# Patient Record
Sex: FEMALE | Race: WHITE | URBAN - METROPOLITAN AREA
[De-identification: names, ages, dates, MRNs, and addresses within clinical notes are randomized per-mention and may not be internally consistent; named-entity substitution may affect disease eponyms.]

---

## 2019-03-07 ENCOUNTER — Encounter (OUTPATIENT)
Dept: URBAN - METROPOLITAN AREA CLINIC 71 | Facility: CLINIC | Age: 74
End: 2019-03-07
Payer: MEDICARE

## 2019-03-07 PROCEDURE — 99214 OFFICE O/P EST MOD 30 MIN: CPT | Performed by: OPHTHALMOLOGY

## 2019-03-19 ENCOUNTER — Encounter (OUTPATIENT)
Dept: URBAN - METROPOLITAN AREA CLINIC 71 | Facility: CLINIC | Age: 74
End: 2019-03-19
Payer: MEDICARE

## 2019-03-19 PROCEDURE — 99213 OFFICE O/P EST LOW 20 MIN: CPT | Performed by: OPHTHALMOLOGY

## 2019-04-02 ENCOUNTER — Encounter (OUTPATIENT)
Dept: URBAN - METROPOLITAN AREA CLINIC 71 | Facility: CLINIC | Age: 74
End: 2019-04-02
Payer: MEDICARE

## 2019-04-02 PROCEDURE — 99214 OFFICE O/P EST MOD 30 MIN: CPT | Performed by: OPHTHALMOLOGY

## 2019-09-12 ENCOUNTER — Encounter (OUTPATIENT)
Dept: URBAN - METROPOLITAN AREA CLINIC 71 | Facility: CLINIC | Age: 74
End: 2019-09-12
Payer: MEDICARE

## 2019-09-12 PROCEDURE — 92133 CPTRZD OPH DX IMG PST SGM ON: CPT | Performed by: OPHTHALMOLOGY

## 2019-09-12 PROCEDURE — 92014 COMPRE OPH EXAM EST PT 1/>: CPT | Performed by: OPHTHALMOLOGY

## 2019-10-31 ENCOUNTER — Encounter (OUTPATIENT)
Dept: URBAN - METROPOLITAN AREA CLINIC 71 | Facility: CLINIC | Age: 74
End: 2019-10-31
Payer: MEDICARE

## 2019-10-31 PROCEDURE — 92014 COMPRE OPH EXAM EST PT 1/>: CPT | Performed by: OPHTHALMOLOGY

## 2019-12-03 ENCOUNTER — Encounter (OUTPATIENT)
Dept: URBAN - METROPOLITAN AREA CLINIC 71 | Facility: CLINIC | Age: 74
End: 2019-12-03
Payer: MEDICARE

## 2019-12-03 PROCEDURE — 92083 EXTENDED VISUAL FIELD XM: CPT | Performed by: OPHTHALMOLOGY

## 2019-12-03 PROCEDURE — 99214 OFFICE O/P EST MOD 30 MIN: CPT | Performed by: OPHTHALMOLOGY

## 2020-03-05 ENCOUNTER — OFFICE VISIT (OUTPATIENT)
Dept: URBAN - METROPOLITAN AREA CLINIC 71 | Facility: CLINIC | Age: 75
End: 2020-03-05
Payer: MEDICARE

## 2020-03-05 PROCEDURE — 99214 OFFICE O/P EST MOD 30 MIN: CPT | Performed by: OPHTHALMOLOGY

## 2020-03-05 PROCEDURE — 92133 CPTRZD OPH DX IMG PST SGM ON: CPT | Performed by: OPHTHALMOLOGY

## 2020-03-05 ASSESSMENT — INTRAOCULAR PRESSURE
OD: 19
OD: 22
OS: 22
OS: 23

## 2020-03-05 NOTE — IMPRESSION/PLAN
Impression: Primary open-angle glaucoma, bilateral, moderate stage: R81.7787. Plan: Glaucoma is usually a disease of high pressure in the eye that damages the optic nerve and causes loss of peripheral vision and possibly even blindness. Glaucoma treatment with various combinations of eye drops will usually help to lower the eye pressure and prevent further damage. In advanced or poorly controlled cases, laser treatment of conventional glaucoma surgery may be necessary. Contact office if drops are causing pain, irritation, or blurry vision after use. 

Continue Combigan OU BID

## 2020-06-23 ENCOUNTER — OFFICE VISIT (OUTPATIENT)
Dept: URBAN - METROPOLITAN AREA CLINIC 71 | Facility: CLINIC | Age: 75
End: 2020-06-23
Payer: MEDICARE

## 2020-06-23 PROCEDURE — 99213 OFFICE O/P EST LOW 20 MIN: CPT | Performed by: OPHTHALMOLOGY

## 2020-06-23 ASSESSMENT — INTRAOCULAR PRESSURE
OS: 20
OD: 20

## 2020-06-23 NOTE — IMPRESSION/PLAN
Impression: Age-related nuclear cataract, bilateral: H25.13. Plan: When patient returns in 1 month to recheck IOP, will also evaluation cataracts at that time to assess if patient is ready for cataract surgery.

## 2020-06-23 NOTE — IMPRESSION/PLAN
Impression: Primary open-angle glaucoma, bilateral, moderate stage: U28.7468. Plan: IOP remaining the same. Would like to see it more in the low teens. D/C syd at this time. Will give patient a sample of Alphagan BID OU. Recheck in 1 month for IOP check. Continue latanoprost QHS OU.

## 2020-07-23 ENCOUNTER — OFFICE VISIT (OUTPATIENT)
Dept: URBAN - METROPOLITAN AREA CLINIC 71 | Facility: CLINIC | Age: 75
End: 2020-07-23
Payer: MEDICARE

## 2020-07-23 PROCEDURE — 92133 CPTRZD OPH DX IMG PST SGM ON: CPT | Performed by: OPHTHALMOLOGY

## 2020-07-23 PROCEDURE — 92012 INTRM OPH EXAM EST PATIENT: CPT | Performed by: OPHTHALMOLOGY

## 2020-07-23 RX ORDER — BRIMONIDINE TARTRATE 1 MG/ML
0.1 % SOLUTION/ DROPS OPHTHALMIC
Qty: 1 | Refills: 3 | Status: INACTIVE
Start: 2020-07-23 | End: 2020-07-23

## 2020-07-23 ASSESSMENT — INTRAOCULAR PRESSURE
OD: 15
OD: 23
OS: 23
OS: 10

## 2020-07-23 ASSESSMENT — KERATOMETRY
OD: 45.50
OS: 45.50

## 2020-07-23 NOTE — IMPRESSION/PLAN
Impression: Primary open-angle glaucoma, bilateral, moderate stage: L45.4681. Plan: Referring patient to Dr. Melinda Agosto prior to cataract surgery. IOP is still on the higher side. Will refer patient for next avail with Dr. Melinda Agosto for evaluation. Will wait to hear  his suggestion before moving forward. Continue on latanoprost QHS OU Brimonidine BID OU

## 2020-07-23 NOTE — IMPRESSION/PLAN
Impression: Age-related nuclear cataract, bilateral: H25.13. Plan: See plan for POAG. Patient to be evaluation by Dr. Catarina Hunter before proceeding with surgery.

## 2020-09-02 ENCOUNTER — OFFICE VISIT (OUTPATIENT)
Dept: URBAN - METROPOLITAN AREA CLINIC 76 | Facility: CLINIC | Age: 75
End: 2020-09-02
Payer: MEDICARE

## 2020-09-02 PROCEDURE — 92020 GONIOSCOPY: CPT | Performed by: OPHTHALMOLOGY

## 2020-09-02 PROCEDURE — 92014 COMPRE OPH EXAM EST PT 1/>: CPT | Performed by: OPHTHALMOLOGY

## 2020-09-02 RX ORDER — TIMOLOL MALEATE 5 MG/ML
0.5 % SOLUTION/ DROPS OPHTHALMIC
Qty: 15 | Refills: 3 | Status: INACTIVE
Start: 2020-09-02 | End: 2021-10-18

## 2020-09-02 RX ORDER — BRIMONIDINE TARTRATE 2 MG/ML
0.2 % SOLUTION/ DROPS OPHTHALMIC
Qty: 15 | Refills: 3 | Status: INACTIVE
Start: 2020-09-02 | End: 2020-10-13

## 2020-09-02 ASSESSMENT — VISUAL ACUITY
OS: 20/50
OD: 20/25

## 2020-09-02 ASSESSMENT — INTRAOCULAR PRESSURE
OD: 21
OS: 20

## 2020-09-02 ASSESSMENT — KERATOMETRY
OD: 45.13
OS: 45.13

## 2020-09-02 NOTE — IMPRESSION/PLAN
Impression: Age-related nuclear cataract, bilateral: H25.13. Plan: Continue to monitor.  Do not recommend Cataract Sx until Glaucoma status is stable

## 2020-09-02 NOTE — IMPRESSION/PLAN
Impression: Primary open-angle glaucoma, bilateral, moderate stage: O15.8607. Bilateral.
IOP's too high for ON appearance. VF reviewed today, poor reliability due high FP. Target: Low teens Plan: Discussed diagnosis in detail with patient. Continue Latanoprost QHS OU, switch to Brimonidine BID OU due to cost of Alphagan. Discussed option of starting additional meds. Will start Timolol BID OU. If IOP not at target recommend starting Dorzolmaide BID OU. may need to consider SLT OU if IOP still not at target on MMT.  
Need to repeat VF

## 2020-10-13 ENCOUNTER — OFFICE VISIT (OUTPATIENT)
Dept: URBAN - METROPOLITAN AREA CLINIC 71 | Facility: CLINIC | Age: 75
End: 2020-10-13
Payer: MEDICARE

## 2020-10-13 PROCEDURE — 92083 EXTENDED VISUAL FIELD XM: CPT | Performed by: OPHTHALMOLOGY

## 2020-10-13 PROCEDURE — 99213 OFFICE O/P EST LOW 20 MIN: CPT | Performed by: OPHTHALMOLOGY

## 2020-10-13 ASSESSMENT — INTRAOCULAR PRESSURE
OD: 16
OD: 20
OS: 15
OS: 20

## 2020-10-13 NOTE — ASSESSMENT/PLAN
Impression: Visual Field - OD: Good-nasal step; OS: Good-within normal limits Plan: nasal step od
wnl os

## 2020-10-13 NOTE — IMPRESSION/PLAN
Impression: Primary open-angle glaucoma, bilateral, moderate stage: F58.3215. Plan: Glaucoma is usually a disease of high pressure in the eye that damages the optic nerve and causes loss of peripheral vision and possibly even blindness. Glaucoma treatment with various combinations of eye drops will usually help to lower the eye pressure and prevent further damage. In advanced or poorly controlled cases, laser treatment of conventional glaucoma surgery may be necessary. Contact office if drops are causing pain, irritation, or blurry vision after use. 
Add Brimonidine OU BID

## 2020-10-15 RX ORDER — BRIMONIDINE TARTRATE 2 MG/ML
0.2 % SOLUTION/ DROPS OPHTHALMIC
Qty: 1 | Refills: 3 | Status: INACTIVE
Start: 2020-10-15 | End: 2022-01-18

## 2020-11-03 ENCOUNTER — OFFICE VISIT (OUTPATIENT)
Dept: URBAN - METROPOLITAN AREA CLINIC 71 | Facility: CLINIC | Age: 75
End: 2020-11-03
Payer: MEDICARE

## 2020-11-03 PROCEDURE — 99212 OFFICE O/P EST SF 10 MIN: CPT | Performed by: OPHTHALMOLOGY

## 2020-11-03 ASSESSMENT — INTRAOCULAR PRESSURE
OD: 18
OD: 17
OS: 18
OS: 14

## 2020-11-03 NOTE — IMPRESSION/PLAN
Impression: Age-related nuclear cataract, bilateral: H25.13. Plan: Recommending cataract surgery as it may lower pressure and istent can be inserted during surgery. Referring patient to Dr. Mathew Marin for surgery with istent.

## 2020-11-03 NOTE — IMPRESSION/PLAN
Impression: Primary open-angle glaucoma, bilateral, moderate stage: W53.4823. Plan: Continue latanoprost QHS OU, timolol BID OU an brimonidine BID OU. Recommending patient to have cataract surgery with an istent to help with glaucoma control. IOP is not in target range with drops.

## 2020-11-10 ENCOUNTER — OFFICE VISIT (OUTPATIENT)
Dept: URBAN - METROPOLITAN AREA CLINIC 71 | Facility: CLINIC | Age: 75
End: 2020-11-10
Payer: MEDICARE

## 2020-11-10 DIAGNOSIS — H40.1132 PRIMARY OPEN-ANGLE GLAUCOMA, BILATERAL, MODERATE STAGE: Primary | ICD-10-CM

## 2020-11-10 DIAGNOSIS — H25.13 AGE-RELATED NUCLEAR CATARACT, BILATERAL: ICD-10-CM

## 2020-11-10 PROCEDURE — 99211 OFF/OP EST MAY X REQ PHY/QHP: CPT | Performed by: OPHTHALMOLOGY

## 2020-11-10 ASSESSMENT — INTRAOCULAR PRESSURE
OS: 11
OD: 12

## 2020-11-10 NOTE — IMPRESSION/PLAN
Impression: Primary open-angle glaucoma, bilateral, moderate stage: C71.5585. Plan: Patient was supposed to be seen by Dr. Anthony Hendricks for discussion of istent procedure during cataract surgery. After discussion with patient, recommend her to see Dr. Sunny Shook for second opinion and possible cataract eval with istent.

## 2021-01-12 ENCOUNTER — OFFICE VISIT (OUTPATIENT)
Dept: URBAN - METROPOLITAN AREA CLINIC 71 | Facility: CLINIC | Age: 76
End: 2021-01-12
Payer: MEDICARE

## 2021-01-12 PROCEDURE — 99212 OFFICE O/P EST SF 10 MIN: CPT | Performed by: OPHTHALMOLOGY

## 2021-01-12 ASSESSMENT — INTRAOCULAR PRESSURE
OD: 20
OS: 17
OD: 18
OS: 20

## 2021-01-12 NOTE — IMPRESSION/PLAN
Impression: Age-related nuclear cataract, bilateral: H25.13. Plan: Will have patient see Dr. Larissa Bolivar for cataract eval with discussion on if patient is a candidate for istent.

## 2021-01-12 NOTE — IMPRESSION/PLAN
Impression: Primary open-angle glaucoma, bilateral, moderate stage: X34.9146. Plan: IOP is borderline, but stable. Continue use of latanoprost QHS OU, brimonidine BID OU and timolol BID OU. Patient to return for cat eval and possible istent. Will have patient discuss with Dr. Lico Hayward if she is a candidate for istent.

## 2021-02-17 ENCOUNTER — OFFICE VISIT (OUTPATIENT)
Dept: URBAN - METROPOLITAN AREA CLINIC 71 | Facility: CLINIC | Age: 76
End: 2021-02-17
Payer: MEDICARE

## 2021-02-17 DIAGNOSIS — H52.03 HYPERMETROPIA, BILATERAL: ICD-10-CM

## 2021-02-17 PROCEDURE — 99213 OFFICE O/P EST LOW 20 MIN: CPT | Performed by: OPHTHALMOLOGY

## 2021-02-17 PROCEDURE — 92133 CPTRZD OPH DX IMG PST SGM ON: CPT | Performed by: OPHTHALMOLOGY

## 2021-02-17 ASSESSMENT — VISUAL ACUITY
OS: 20/40
OD: 20/30

## 2021-02-17 ASSESSMENT — KERATOMETRY
OD: 45.50
OS: 45.38

## 2021-02-17 ASSESSMENT — INTRAOCULAR PRESSURE
OD: 22
OS: 24

## 2021-02-17 NOTE — IMPRESSION/PLAN
Impression: Age-related nuclear cataract, bilateral: H25.13. Plan: Discussed with patient that cataract surgery is recommended in both eyes. Informed patient or risks, benefits and alternatives. Patient elects to proceed with cataract surgery. Patient is a good candidate for istent procedure with cataract removal. Discussed procedure with patient. Will have patient return for cataract surgery pre-op appointment. OD then OS.

## 2021-02-17 NOTE — IMPRESSION/PLAN
Impression: Primary open-angle glaucoma, bilateral, moderate stage: C60.9657. OCT ordered and reviewed. Plan: In agreement with Dr. Ino Matthews that IOP is too high for optic nerve health. Patient would be a good candidate for istent with cataract procedure. Patient to continue use of brimonidine BID OU, latanoprost QHS OU and timoloL bid ou. IOP should lower after cataract removal and istent, but informed patient that it's not a guarantee that she will be able to stop the drops. Istent is not an immediate drop, it will likely lower in 3 months due to steroid injection during cataract surgery possibly causing an IOP spike. Patient aware that at 1 day PO appointment she may need to be put on Diamox after surgery for a few days due to a spike being expected. Will have patient return to Dr. Ino Matthews after cataract surgery and he will coordinate if drops are able to be lessened or changed. Stressed importance of continue compliance with drops even after surgery.

## 2021-04-06 ENCOUNTER — PRE-OPERATIVE VISIT (OUTPATIENT)
Dept: URBAN - METROPOLITAN AREA CLINIC 71 | Facility: CLINIC | Age: 76
End: 2021-04-06
Payer: MEDICARE

## 2021-04-06 DIAGNOSIS — H25.11 AGE-RELATED NUCLEAR CATARACT, RIGHT EYE: ICD-10-CM

## 2021-04-06 DIAGNOSIS — H43.813 VITREOUS DEGENERATION, BILATERAL: ICD-10-CM

## 2021-04-06 PROCEDURE — 92136 OPHTHALMIC BIOMETRY: CPT | Performed by: OPHTHALMOLOGY

## 2021-04-06 PROCEDURE — 92012 INTRM OPH EXAM EST PATIENT: CPT | Performed by: OPHTHALMOLOGY

## 2021-04-06 NOTE — IMPRESSION/PLAN
Impression: Age-related nuclear cataract, bilateral: H25.13. Plan: Discussed diagnosis in detail with patient. Discussed risks and benefits and patient understands. Discussed treatment options with patient. Surgical treatment is necessary to improve vision. Surgical risks and benefits were discussed, explained and understood by patient. Patient elects to have surgery. Pre op instructions given and understood. Lid scrubs and hygiene were explained. Patient instructed to use artificial tears as needed. Post op instructions given and understood. Continue using current medication(s). PROCEED WITH CARE PLUS CATARACT SURGERY WITH ORA ISTENSCOTT, OS FIRST, DISTANCE, THEN OD, DISTANCE, DEXYCU, MOXIFLOXICIN, CS DONE.

## 2021-04-21 ENCOUNTER — SURGERY (OUTPATIENT)
Dept: URBAN - METROPOLITAN AREA SURGERY 45 | Facility: SURGERY | Age: 76
End: 2021-04-21
Payer: MEDICARE

## 2021-04-21 ENCOUNTER — SURGERY (OUTPATIENT)
Dept: URBAN - METROPOLITAN AREA SURGERY 44 | Facility: SURGERY | Age: 76
End: 2021-04-21
Payer: MEDICARE

## 2021-04-21 PROCEDURE — 0191T INSERTION OF ANTERIOR SEGMENT AQUEOUS DRAINAGE DEVICE, W/OUT EXTRAOCULAR RESERVO: CPT | Performed by: OPHTHALMOLOGY

## 2021-04-21 PROCEDURE — 66984 XCAPSL CTRC RMVL W/O ECP: CPT | Performed by: OPHTHALMOLOGY

## 2021-04-21 PROCEDURE — G8918 PT W/O PREOP ORDER IV AB PRO: HCPCS | Performed by: CLINIC/CENTER

## 2021-04-21 PROCEDURE — G8907 PT DOC NO EVENTS ON DISCHARG: HCPCS | Performed by: CLINIC/CENTER

## 2021-04-22 ENCOUNTER — POST-OPERATIVE VISIT (OUTPATIENT)
Dept: URBAN - METROPOLITAN AREA CLINIC 71 | Facility: CLINIC | Age: 76
End: 2021-04-22
Payer: MEDICARE

## 2021-04-22 PROCEDURE — 99024 POSTOP FOLLOW-UP VISIT: CPT | Performed by: OPHTHALMOLOGY

## 2021-04-22 ASSESSMENT — INTRAOCULAR PRESSURE
OD: 12
OS: 35
OS: 40

## 2021-04-22 NOTE — IMPRESSION/PLAN
Impression: S/P Cataract Extraction by phacoemulsification/IOL placement/iStent placement; ORA OS - 1 Day. Encounter for surgical aftercare following surgery on a sense organ  Z48.810. Plan: IOL & istent in place, healing well. Ok to proceed with the right eye. IOP was elevated in the OS @ 35, burped wound- down to 20 continue all glaucoma drops as rx Gave 1 diamox in clinic, 1 to take in the afternoon and 1 drop of cipro given in clinic - will r/c IOP tech only tomorrow if under 25 good to go.

## 2021-04-23 ENCOUNTER — POST-OPERATIVE VISIT (OUTPATIENT)
Dept: URBAN - METROPOLITAN AREA CLINIC 71 | Facility: CLINIC | Age: 76
End: 2021-04-23
Payer: MEDICARE

## 2021-04-23 PROCEDURE — 99024 POSTOP FOLLOW-UP VISIT: CPT | Performed by: OPHTHALMOLOGY

## 2021-04-23 ASSESSMENT — INTRAOCULAR PRESSURE
OD: 10
OS: 19

## 2021-04-23 NOTE — IMPRESSION/PLAN
Impression:  Encounter for surgical aftercare following surgery on a sense organ  Z48.810. Excellent post op course Plan: Pt requested to be seen next week by Dr Sarah Gallardo before surgery 2nd eye.  Patient scheduled for next week

## 2021-04-30 ENCOUNTER — POST-OPERATIVE VISIT (OUTPATIENT)
Dept: URBAN - METROPOLITAN AREA CLINIC 71 | Facility: CLINIC | Age: 76
End: 2021-04-30
Payer: MEDICARE

## 2021-04-30 DIAGNOSIS — Z48.810 ENCOUNTER FOR SURGICAL AFTERCARE FOLLOWING SURGERY ON A SENSE ORGAN: Primary | ICD-10-CM

## 2021-04-30 PROCEDURE — 99024 POSTOP FOLLOW-UP VISIT: CPT | Performed by: OPHTHALMOLOGY

## 2021-04-30 ASSESSMENT — INTRAOCULAR PRESSURE
OD: 17
OS: 17

## 2021-04-30 NOTE — IMPRESSION/PLAN
Impression: S/P Cataract Extraction by phacoemulsification/IOL placement/iStent placement; ORA OS - 9 Days. Encounter for surgical aftercare following surgery on a sense organ  Z48.810. Healing well. IOL well centered. IOP stable. Plan: Patient is cleared to proceed with second eye OD cataract surgery with istent as scheduled.

## 2021-05-03 ENCOUNTER — SURGERY (OUTPATIENT)
Dept: URBAN - METROPOLITAN AREA SURGERY 45 | Facility: SURGERY | Age: 76
End: 2021-05-03
Payer: MEDICARE

## 2021-05-03 ENCOUNTER — SURGERY (OUTPATIENT)
Dept: URBAN - METROPOLITAN AREA SURGERY 44 | Facility: SURGERY | Age: 76
End: 2021-05-03
Payer: MEDICARE

## 2021-05-03 DIAGNOSIS — H25.21 AGE-RELATED CATARACT, MORGAGNIAN TYPE, RIGHT EYE: Primary | ICD-10-CM

## 2021-05-03 PROCEDURE — G8907 PT DOC NO EVENTS ON DISCHARG: HCPCS | Performed by: CLINIC/CENTER

## 2021-05-03 PROCEDURE — 0191T INSERTION OF ANTERIOR SEGMENT AQUEOUS DRAINAGE DEVICE, W/OUT EXTRAOCULAR RESERVO: CPT | Performed by: OPHTHALMOLOGY

## 2021-05-03 PROCEDURE — 66982 XCAPSL CTRC RMVL CPLX WO ECP: CPT | Performed by: OPHTHALMOLOGY

## 2021-05-04 ENCOUNTER — POST-OPERATIVE VISIT (OUTPATIENT)
Dept: URBAN - METROPOLITAN AREA CLINIC 71 | Facility: CLINIC | Age: 76
End: 2021-05-04
Payer: MEDICARE

## 2021-05-04 PROCEDURE — 99024 POSTOP FOLLOW-UP VISIT: CPT | Performed by: OPHTHALMOLOGY

## 2021-05-04 ASSESSMENT — INTRAOCULAR PRESSURE
OS: 13
OD: 13

## 2021-05-04 NOTE — IMPRESSION/PLAN
Impression: S/P COMPLEX [id number] Cataract Extraction by phacoemulsification with IOL placement; ORA; Shunt:  iStent Inject OD - 1 Day. Encounter for surgical aftercare following surgery on a sense organ  Z48.810. Excellent post op course   Post operative instructions reviewed - Informed patient that vision should improve over the next couple days Plan: Continue with 4 week PO Ref as scheduled.  Contact office if any issues

## 2021-06-23 ENCOUNTER — POST-OPERATIVE VISIT (OUTPATIENT)
Dept: URBAN - METROPOLITAN AREA CLINIC 71 | Facility: CLINIC | Age: 76
End: 2021-06-23
Payer: MEDICARE

## 2021-06-23 DIAGNOSIS — Z96.1 PRESENCE OF INTRAOCULAR LENS: Primary | ICD-10-CM

## 2021-06-23 PROCEDURE — 99024 POSTOP FOLLOW-UP VISIT: CPT | Performed by: OPTOMETRIST

## 2021-06-23 ASSESSMENT — INTRAOCULAR PRESSURE
OS: 17
OD: 15

## 2021-06-23 ASSESSMENT — VISUAL ACUITY
OS: 20/25
OD: 20/20

## 2021-06-23 NOTE — IMPRESSION/PLAN
Impression: S/P COMPLEX [id number] Cataract Extraction by phacoemulsification with IOL placement; ORA; Shunt:  iStent Inject OD - 51 Days. Presence of intraocular lens  Z96.1. Excellent post op course Plan: IOL stable and in place OU. New glasses RX given today for progressives.  Recommend PT F/U in 6 months for DFE w/ OCT POAG OU/ Capsule check OU

## 2021-12-21 ENCOUNTER — OFFICE VISIT (OUTPATIENT)
Dept: URBAN - METROPOLITAN AREA CLINIC 71 | Facility: CLINIC | Age: 76
End: 2021-12-21
Payer: MEDICARE

## 2021-12-21 PROCEDURE — 92133 CPTRZD OPH DX IMG PST SGM ON: CPT | Performed by: OPHTHALMOLOGY

## 2021-12-21 PROCEDURE — 99213 OFFICE O/P EST LOW 20 MIN: CPT | Performed by: OPHTHALMOLOGY

## 2021-12-21 ASSESSMENT — INTRAOCULAR PRESSURE
OD: 17
OD: 16
OS: 19
OS: 18

## 2021-12-21 NOTE — IMPRESSION/PLAN
Impression: Primary open-angle glaucoma, bilateral, moderate stage: G82.3003. S/P iStent OU. OCT ordered and reviewed. RNFL is stable compared to previous scans. IOP is remaining stable. Patient is tolerating medication well. Plan: Patient to continue use of latanoprost QHS OU and timoloL BID OU, but D/C use of brimondine. Return to our care in 4-6 weeks to recheck the IOP with the new drop regimen. May discuss the option of Rocklatan if the IOP is not stable off the brimonidine.

## 2021-12-21 NOTE — IMPRESSION/PLAN
Impression: Dry eye syndrome of bilateral lacrimal glands: H04.123. Partially secondary to the IOP drops. Plan: Recommend use of artificial tears as needed.

## 2022-01-18 ENCOUNTER — OFFICE VISIT (OUTPATIENT)
Dept: URBAN - METROPOLITAN AREA CLINIC 71 | Facility: CLINIC | Age: 77
End: 2022-01-18
Payer: MEDICARE

## 2022-01-18 DIAGNOSIS — H04.123 DRY EYE SYNDROME OF BILATERAL LACRIMAL GLANDS: ICD-10-CM

## 2022-01-18 PROCEDURE — 99212 OFFICE O/P EST SF 10 MIN: CPT | Performed by: OPHTHALMOLOGY

## 2022-01-18 ASSESSMENT — INTRAOCULAR PRESSURE
OD: 16
OS: 16
OD: 13

## 2022-01-18 NOTE — IMPRESSION/PLAN
Impression: Primary open-angle glaucoma, bilateral, moderate stage: H58.9221. S/P iStent OU. IOP is remaining stable without the use of brimonidine. Patient is tolerating medication well. Plan: Patient to continue use of latanoprost QHS OU and timoloL BID OU. Patient to return for VF testing in 4 months.

## 2022-06-01 ENCOUNTER — OFFICE VISIT (OUTPATIENT)
Dept: URBAN - METROPOLITAN AREA CLINIC 71 | Facility: CLINIC | Age: 77
End: 2022-06-01
Payer: MEDICARE

## 2022-06-01 DIAGNOSIS — H04.123 DRY EYE SYNDROME OF BILATERAL LACRIMAL GLANDS: ICD-10-CM

## 2022-06-01 DIAGNOSIS — Z96.1 PRESENCE OF INTRAOCULAR LENS: ICD-10-CM

## 2022-06-01 DIAGNOSIS — H40.1132 PRIMARY OPEN-ANGLE GLAUCOMA, BILATERAL, MODERATE STAGE: Primary | ICD-10-CM

## 2022-06-01 PROCEDURE — 92083 EXTENDED VISUAL FIELD XM: CPT | Performed by: OPHTHALMOLOGY

## 2022-06-01 PROCEDURE — 99212 OFFICE O/P EST SF 10 MIN: CPT | Performed by: OPHTHALMOLOGY

## 2022-06-01 ASSESSMENT — INTRAOCULAR PRESSURE
OS: 13
OD: 10
OD: 14
OS: 15

## 2022-06-01 NOTE — IMPRESSION/PLAN
Impression: Primary open-angle glaucoma, bilateral, moderate stage: Y10.3016. S/P iStent OU. VF ordered today and reviewed. VF results are stable compared to the previous testing. IOP is remaining stable. Patient is tolerating medication well. Plan: Patient to continue use of latanoprost QHS OU and timoloL BID OU. Patient to return in 4 months for an IOP check with [facility] imaging.

## 2022-10-11 ENCOUNTER — OFFICE VISIT (OUTPATIENT)
Dept: URBAN - METROPOLITAN AREA CLINIC 71 | Facility: CLINIC | Age: 77
End: 2022-10-11
Payer: MEDICARE

## 2022-10-11 DIAGNOSIS — Z96.1 PRESENCE OF INTRAOCULAR LENS: ICD-10-CM

## 2022-10-11 DIAGNOSIS — H40.1132 PRIMARY OPEN-ANGLE GLAUCOMA, BILATERAL, MODERATE STAGE: Primary | ICD-10-CM

## 2022-10-11 DIAGNOSIS — H04.123 DRY EYE SYNDROME OF BILATERAL LACRIMAL GLANDS: ICD-10-CM

## 2022-10-11 PROCEDURE — 99212 OFFICE O/P EST SF 10 MIN: CPT | Performed by: OPHTHALMOLOGY

## 2022-10-11 ASSESSMENT — INTRAOCULAR PRESSURE
OD: 16
OS: 12
OD: 12
OS: 16

## 2022-10-11 NOTE — IMPRESSION/PLAN
Impression: Primary open-angle glaucoma, bilateral, moderate stage: I01.7135. S/P iStent OU. [facility] ordered today and reviewed. IOP is remaining stable. Patient is having some irritation which is most likely due to the timolol. Plan: We will do a trial off of the timolol and see if the IOP remains in a normal range. D/C timolol. Patient to continue use of latanoprost QHS OU. Patient to return in 4-6 weeks for an IOP check.

## 2023-01-04 ENCOUNTER — OFFICE VISIT (OUTPATIENT)
Dept: URBAN - METROPOLITAN AREA CLINIC 71 | Facility: CLINIC | Age: 78
End: 2023-01-04
Payer: MEDICARE

## 2023-01-04 DIAGNOSIS — H04.123 DRY EYE SYNDROME OF BILATERAL LACRIMAL GLANDS: ICD-10-CM

## 2023-01-04 DIAGNOSIS — H40.1132 PRIMARY OPEN-ANGLE GLAUCOMA, BILATERAL, MODERATE STAGE: Primary | ICD-10-CM

## 2023-01-04 DIAGNOSIS — Z96.1 PRESENCE OF INTRAOCULAR LENS: ICD-10-CM

## 2023-01-04 PROCEDURE — 99212 OFFICE O/P EST SF 10 MIN: CPT | Performed by: OPHTHALMOLOGY

## 2023-01-04 RX ORDER — TIMOLOL MALEATE 5 MG/ML
0.5 % SOLUTION/ DROPS OPHTHALMIC
Qty: 15 | Refills: 3 | Status: ACTIVE
Start: 2023-01-04

## 2023-01-04 RX ORDER — LATANOPROST 50 UG/ML
0.005 % SOLUTION OPHTHALMIC
Qty: 7.5 | Refills: 2 | Status: ACTIVE
Start: 2023-01-04

## 2023-01-04 ASSESSMENT — INTRAOCULAR PRESSURE
OS: 20
OD: 12
OD: 20
OS: 12

## 2023-01-04 NOTE — IMPRESSION/PLAN
Impression: Primary open-angle glaucoma, bilateral, moderate stage: G95.3151. S/P iStent OU. IOP is starting to elevate without use of timolol. Plan: Patient to continue use of latanoprost QHS OU. Start back on timolol BID OU. Return in 2-3 months for dilation and OCT testing.

## 2023-04-07 ENCOUNTER — OFFICE VISIT (OUTPATIENT)
Dept: URBAN - METROPOLITAN AREA CLINIC 71 | Facility: CLINIC | Age: 78
End: 2023-04-07
Payer: MEDICARE

## 2023-04-07 DIAGNOSIS — Z96.1 PRESENCE OF INTRAOCULAR LENS: ICD-10-CM

## 2023-04-07 DIAGNOSIS — H40.1132 PRIMARY OPEN-ANGLE GLAUCOMA, BILATERAL, MODERATE STAGE: Primary | ICD-10-CM

## 2023-04-07 DIAGNOSIS — H04.123 DRY EYE SYNDROME OF BILATERAL LACRIMAL GLANDS: ICD-10-CM

## 2023-04-07 PROCEDURE — 92133 CPTRZD OPH DX IMG PST SGM ON: CPT | Performed by: OPHTHALMOLOGY

## 2023-04-07 PROCEDURE — 99213 OFFICE O/P EST LOW 20 MIN: CPT | Performed by: OPHTHALMOLOGY

## 2023-04-07 ASSESSMENT — INTRAOCULAR PRESSURE
OS: 12
OD: 8
OS: 8
OD: 11

## 2023-04-07 NOTE — IMPRESSION/PLAN
Impression: Primary open-angle glaucoma, bilateral, moderate stage: P88.3768. S/P iStent OU. OCT ordered and reviewed today. RNFL remaining stable OU. IOP is stable on treatment regiment. Plan: No changes needed at this time. Continue use of latanoprost QHS OU and Timolol BID OU. Return in 4 months for VF and IOP check.